# Patient Record
Sex: FEMALE | Race: WHITE | NOT HISPANIC OR LATINO | Employment: OTHER | URBAN - METROPOLITAN AREA
[De-identification: names, ages, dates, MRNs, and addresses within clinical notes are randomized per-mention and may not be internally consistent; named-entity substitution may affect disease eponyms.]

---

## 2023-07-23 ENCOUNTER — HOSPITAL ENCOUNTER (EMERGENCY)
Facility: HOSPITAL | Age: 88
Discharge: HOME | End: 2023-07-23
Attending: EMERGENCY MEDICINE
Payer: MEDICARE

## 2023-07-23 VITALS
RESPIRATION RATE: 18 BRPM | SYSTOLIC BLOOD PRESSURE: 107 MMHG | HEART RATE: 68 BPM | OXYGEN SATURATION: 93 % | DIASTOLIC BLOOD PRESSURE: 56 MMHG | TEMPERATURE: 96.4 F

## 2023-07-23 DIAGNOSIS — R04.0 EPISTAXIS: Primary | ICD-10-CM

## 2023-07-23 DIAGNOSIS — Z79.01 CHRONIC ANTICOAGULATION: ICD-10-CM

## 2023-07-23 LAB
BASOPHILS # BLD AUTO: 0.11 THOUSANDS/ÂΜL (ref 0–0.1)
BASOPHILS NFR BLD AUTO: 1 % (ref 0–1)
EOSINOPHIL # BLD AUTO: 0.24 THOUSAND/ÂΜL (ref 0–0.61)
EOSINOPHIL NFR BLD AUTO: 3 % (ref 0–6)
ERYTHROCYTE [DISTWIDTH] IN BLOOD BY AUTOMATED COUNT: 15.7 % (ref 11.6–15.1)
HCT VFR BLD AUTO: 50.6 % (ref 34.8–46.1)
HGB BLD-MCNC: 16.4 G/DL (ref 11.5–15.4)
IMM GRANULOCYTES # BLD AUTO: 0.09 THOUSAND/UL (ref 0–0.2)
IMM GRANULOCYTES NFR BLD AUTO: 1 % (ref 0–2)
INR PPP: 2.49 (ref 0.84–1.19)
LYMPHOCYTES # BLD AUTO: 3.68 THOUSANDS/ÂΜL (ref 0.6–4.47)
LYMPHOCYTES NFR BLD AUTO: 42 % (ref 14–44)
MCH RBC QN AUTO: 32.3 PG (ref 26.8–34.3)
MCHC RBC AUTO-ENTMCNC: 32.4 G/DL (ref 31.4–37.4)
MCV RBC AUTO: 100 FL (ref 82–98)
MONOCYTES # BLD AUTO: 1.05 THOUSAND/ÂΜL (ref 0.17–1.22)
MONOCYTES NFR BLD AUTO: 12 % (ref 4–12)
NEUTROPHILS # BLD AUTO: 3.69 THOUSANDS/ÂΜL (ref 1.85–7.62)
NEUTS SEG NFR BLD AUTO: 41 % (ref 43–75)
NRBC BLD AUTO-RTO: 0 /100 WBCS
PLATELET # BLD AUTO: 176 THOUSANDS/UL (ref 149–390)
PMV BLD AUTO: 9.6 FL (ref 8.9–12.7)
PROTHROMBIN TIME: 27.2 SECONDS (ref 11.6–14.5)
RBC # BLD AUTO: 5.08 MILLION/UL (ref 3.81–5.12)
WBC # BLD AUTO: 8.86 THOUSAND/UL (ref 4.31–10.16)

## 2023-07-23 PROCEDURE — 85610 PROTHROMBIN TIME: CPT

## 2023-07-23 PROCEDURE — 85025 COMPLETE CBC W/AUTO DIFF WBC: CPT

## 2023-07-23 PROCEDURE — 99283 EMERGENCY DEPT VISIT LOW MDM: CPT

## 2023-07-23 PROCEDURE — 36415 COLL VENOUS BLD VENIPUNCTURE: CPT

## 2023-07-23 PROCEDURE — 99284 EMERGENCY DEPT VISIT MOD MDM: CPT | Performed by: EMERGENCY MEDICINE

## 2023-07-23 RX ORDER — OXYMETAZOLINE HYDROCHLORIDE 0.05 G/100ML
2 SPRAY NASAL ONCE
Status: COMPLETED | OUTPATIENT
Start: 2023-07-23 | End: 2023-07-23

## 2023-07-23 RX ORDER — TRANEXAMIC ACID 100 MG/ML
500 INJECTION, SOLUTION INTRAVENOUS ONCE
Status: COMPLETED | OUTPATIENT
Start: 2023-07-23 | End: 2023-07-23

## 2023-07-23 RX ORDER — LIDOCAINE HYDROCHLORIDE AND EPINEPHRINE 10; 10 MG/ML; UG/ML
1 INJECTION, SOLUTION INFILTRATION; PERINEURAL ONCE
Status: COMPLETED | OUTPATIENT
Start: 2023-07-23 | End: 2023-07-23

## 2023-07-23 RX ADMIN — OXYMETAZOLINE HYDROCHLORIDE 2 SPRAY: 0.05 SPRAY NASAL at 04:50

## 2023-07-23 RX ADMIN — LIDOCAINE HYDROCHLORIDE,EPINEPHRINE BITARTRATE 1 ML: 10; .01 INJECTION, SOLUTION INFILTRATION; PERINEURAL at 05:53

## 2023-07-23 RX ADMIN — TRANEXAMIC ACID 500 MG: 100 INJECTION INTRAVENOUS at 04:50

## 2023-07-23 NOTE — ED PROVIDER NOTES
History  Chief Complaint   Patient presents with   • Nose Bleed Re-evaluation     Pt woke up and felt a gush from her nose. Eye also bleeding. On coumadin     80year-old female with a history of A-fib on warfarin who presents complaining of epistaxis. The patient states that she woke up from her sleep and her nose was bleeding. She states that she has a history of nosebleeds and has had to be packed in the past.  The patient's daughter states that she tried Afrin and held pressure for 20 minutes however the bleeding continued. The patient denies any falls or trauma. The patient's daughter states that the left side of the patient's face always appears mildly swollen due to a fall years ago. The patient denies headache, lightheadedness, chest pain, palpitations, shortness of breath, nausea, vomiting. None       No past medical history on file. No past surgical history on file. No family history on file. I have reviewed and agree with the history as documented. No existing history information found. No existing history information found. Review of Systems   Constitutional: Negative for chills, diaphoresis and fever. HENT: Positive for nosebleeds. Negative for congestion and sore throat. Eyes: Negative for pain and redness. Respiratory: Negative for cough and shortness of breath. Cardiovascular: Negative for chest pain, palpitations and leg swelling. Gastrointestinal: Negative for abdominal pain, diarrhea, nausea and vomiting. Genitourinary: Negative for dysuria, flank pain and hematuria. Musculoskeletal: Negative for arthralgias and myalgias. Skin: Negative for color change, pallor and rash. Neurological: Negative for dizziness, syncope, weakness, light-headedness, numbness and headaches. All other systems reviewed and are negative.       Physical Exam  ED Triage Vitals   Temperature Pulse Respirations Blood Pressure SpO2   07/23/23 0435 07/23/23 0435 07/23/23 0435 07/23/23 0435 07/23/23 0435   (!) 96.4 °F (35.8 °C) 71 18 139/65 95 %      Temp Source Heart Rate Source Patient Position - Orthostatic VS BP Location FiO2 (%)   07/23/23 0435 07/23/23 0435 07/23/23 0600 07/23/23 0435 --   Tympanic Monitor Lying Right arm       Pain Score       07/23/23 0435       No Pain             Orthostatic Vital Signs  Vitals:    07/23/23 0435 07/23/23 0600 07/23/23 0700   BP: 139/65 149/60 107/56   Pulse: 71 74 68   Patient Position - Orthostatic VS:  Lying Lying       Physical Exam  Vitals and nursing note reviewed. Constitutional:       General: She is not in acute distress. Appearance: Normal appearance. She is not ill-appearing, toxic-appearing or diaphoretic. HENT:      Head: Normocephalic and atraumatic. Nose: No nasal deformity, septal deviation or nasal tenderness. Right Nostril: Epistaxis present. No septal hematoma. Left Nostril: Epistaxis present. No septal hematoma. Right Sinus: No maxillary sinus tenderness or frontal sinus tenderness. Left Sinus: No maxillary sinus tenderness or frontal sinus tenderness. Mouth/Throat:      Mouth: Mucous membranes are moist.      Comments: Blood in the mouth. No intraoral bleeding. Eyes:      General: No scleral icterus. Extraocular Movements: Extraocular movements intact. Conjunctiva/sclera: Conjunctivae normal.      Pupils: Pupils are equal, round, and reactive to light. Cardiovascular:      Rate and Rhythm: Normal rate and regular rhythm. Pulses: Normal pulses. Heart sounds: Normal heart sounds. No murmur heard. No friction rub. No gallop. Pulmonary:      Effort: Pulmonary effort is normal.      Breath sounds: Normal breath sounds. No wheezing, rhonchi or rales. Abdominal:      General: Abdomen is flat. Palpations: Abdomen is soft. Tenderness: There is no abdominal tenderness. There is no right CVA tenderness, left CVA tenderness, guarding or rebound. Musculoskeletal:         General: No swelling, tenderness, deformity or signs of injury. Normal range of motion. Cervical back: Normal range of motion and neck supple. No rigidity or tenderness. Right lower leg: No edema. Left lower leg: No edema. Lymphadenopathy:      Cervical: No cervical adenopathy. Skin:     General: Skin is warm and dry. Capillary Refill: Capillary refill takes less than 2 seconds. Coloration: Skin is not jaundiced or pale. Findings: No bruising, erythema, lesion or rash. Neurological:      General: No focal deficit present. Mental Status: She is alert and oriented to person, place, and time.          ED Medications  Medications   oxymetazoline (AFRIN) 0.05 % nasal spray 2 spray (2 sprays Each Nare Given by Other 7/23/23 0450)   tranexamic acid 100mg/mL (for epistaxis) 500 mg (500 mg Nasal Given by Other 7/23/23 0450)   lidocaine-epinephrine (XYLOCAINE/EPINEPHRINE) 1 %-1:100,000 injection 1 mL (1 mL Infiltration Given by Other 7/23/23 0553)       Diagnostic Studies  Results Reviewed     Procedure Component Value Units Date/Time    Protime-INR [[id number]]  (Abnormal) Collected: 07/23/23 0624    Lab Status: Final result Specimen: Blood from Arm, Left Updated: 07/23/23 0654     Protime 27.2 seconds      INR 2.49    CBC and differential [[id number]]  (Abnormal) Collected: 07/23/23 0451    Lab Status: Final result Specimen: Blood from Arm, Left Updated: 07/23/23 0500     WBC 8.86 Thousand/uL      RBC 5.08 Million/uL      Hemoglobin 16.4 g/dL      Hematocrit 50.6 %       fL      MCH 32.3 pg      MCHC 32.4 g/dL      RDW 15.7 %      MPV 9.6 fL      Platelets 079 Thousands/uL      nRBC 0 /100 WBCs      Neutrophils Relative 41 %      Immat GRANS % 1 %      Lymphocytes Relative 42 %      Monocytes Relative 12 %      Eosinophils Relative 3 %      Basophils Relative 1 %      Neutrophils Absolute 3.69 Thousands/µL      Immature Grans Absolute 0.09 Thousand/uL Lymphocytes Absolute 3.68 Thousands/µL      Monocytes Absolute 1.05 Thousand/µL      Eosinophils Absolute 0.24 Thousand/µL      Basophils Absolute 0.11 Thousands/µL                  No orders to display         Procedures  Procedures      ED Course                                       Medical Decision Making  51-year-old female with a history of A-fib on warfarin who presents complaining of epistaxis that began during the night. Vitals are within the normal limits. Vitals are within the normal limits. On exam the patient is in no acute distress, epistaxis from the left nares, blood in the patient's mouth but no intraoral bleeding, no facial tenderness, heart is regular rate and rhythm, lungs are clear to auscultation bilaterally. Will order CBC and INR. Afrin is sprayed of both nares and pressure is applied. The patient continues to have slow bleeding in the left nare. TXA is advised and pressure is applied. The patient has very slow bleeding from the left nare. Lidocaine with epinephrine is atomized into the left nare. The patient has no subsequent epistaxis. INR is therapeutic at 2.49. The patient is given return precautions and discharged. Amount and/or Complexity of Data Reviewed  Labs: ordered. Risk  OTC drugs. Prescription drug management. Disposition  Final diagnoses:   Epistaxis   Chronic anticoagulation     Time reflects when diagnosis was documented in both MDM as applicable and the Disposition within this note     Time User Action Codes Description Comment    7/23/2023  7:09 AM Kari BARILLAS Add [R04.0] Epistaxis     7/23/2023  7:10 AM Bebeto Alves Add [Z79.01] Chronic anticoagulation       ED Disposition     ED Disposition   Discharge    Condition   Stable    Date/Time   Sun Jul 23, 2023  7:09 AM    Mary Beth Goncalves discharge to home/self care.                Follow-up Information     Follow up With Specialties Details Why Contact Info Additional [facility] Emergency Department Emergency Medicine Go to  If symptoms worsen [address] [address]  [facility], [address] [facility] [place], [place], [place], [address] [phone]          There are no discharge medications for this patient. No discharge procedures on file. PDMP Review     None           ED Provider  Attending physically available and evaluated Cely Goncalves. I managed the patient along with the ED Attending.     Electronically Signed by         Vanessa Moe DO  07/23/23 5588

## 2023-07-23 NOTE — ED ATTENDING ATTESTATION
Final Diagnoses:     1. Epistaxis           I, Trevor Valderrama MD, saw and evaluated the patient. All available labs and X-rays were ordered by me or the resident / non-physician and have been reviewed by myself. I discussed the patient with the resident / non-physician and agree with the resident's / non-physician practitioner's findings and plan as documented in the resident's / non-physician practicitioner's note, except where noted. At this point, I agree with the current assessment done in the ED. I was present during key portions of all procedures performed unless otherwise stated. Nursing Triage:     Chief Complaint   Patient presents with   • Nose Bleed Re-evaluation     Pt woke up and felt a gush from her nose. Eye also bleeding. On coumadin       HPI:   This is a 80 y.o. female presenting for evaluation of epistaxis. The patietn was perfectly fine today  She went to bed, then woke up, and she had a gush of fluid. There was blood everywhere. She is on warfarin. Denies any acute complaints other than the epistaxis. ASSESSMENT + PLAN:   Epistaxis. - Patient has epistaxis. It looks like this bleeding is       [ x ] Anterior bleed       [    ] Posterior bleed  - Patient is currently on anti-coagulants ? warfarin  - Will attempt control:   Vasoconstriction:        [ x ] Afrin        [    ] Cocaine        [    ] [place]        [    ] Lidocaine-epinephrine   Cautery:        [ x ] Chemical with silver-nitrate around the base of the bleeding area        [    ] Electrical   Physical / Packing        [ x ] Merocel packing ? potentially         [    ] Rhino-rocket        [    ] Epi-stat        [    ] Pediatric hallman         [    ] Balloon catheter   Clotting agents        [ x ] TXA        [    ] Topical thrombin        [    ] DDAVP if dialysis / PLT dysfunction  - Stressed f/u with PCP/ENT + re-evaluate as well as return to ER for worsening or resumption of bleeding.      Physical: Pertinent: marked epistaxis, both nares  There's a lot of blood around her face and on her LEFT eye. I irrigated the eye and removed all the blood clots there. LEFT nare looked more bloody  I held pressure for 5 minutes with my fingers. LEFT face appears more swollen than the RIGHT  No tenderness though. EOMI  No signs of enratpement. General: VS reviewed  Appears in NAD  awake, alert. Well-nourished, well-developed. Appears stated age. Speaking normally in full sentences. Head: Normocephalic, atraumatic  Eyes: EOM-I. No diplopia. No hyphema. No subconjunctival hemorrhages. Symmetrical lids. ENT: Atraumatic external nose and ears. MMM  No malocclusion. No stridor. Normal phonation. No drooling. Normal swallowing. Neck: No JVD. CV: No pallor noted  Lungs:   No tachypnea  No respiratory distress  Abd: soft nt nd no rebound/guarding  MSK:   FROM spontaneously  Skin: Dry, intact. Neuro: Awake, alert, GCS15, CN II-XII grossly intact. Motor grossly intact. Psychiatric/Behavioral: interacting normally; appropriate mood/affect.  Exam: deferred    Vitals:    07/23/23 0435 07/23/23 0600   BP: 139/65 149/60   BP Location: Right arm Right arm   Pulse: 71 74   Resp: 18 18   Temp: (!) 96.4 °F (35.8 °C)    TempSrc: Tympanic    SpO2: 95% 94%     - There are no obvious limitations to social determinants of care. - Nursing note reviewed. - Vitals reviewed. - Orders placed by myself and/or advanced practitioner / resident.    - Previous chart was reviewed  - No language barrier.   - History obtained from patient. - There are no limitations to the history obtained:     Past Medical:    has no past medical history on file. Past Surgical:    has no past surgical history on file.     Social:     Social History     Substance and Sexual Activity   Alcohol Use Not on file     Social History     Tobacco Use   Smoking Status Not on file   Smokeless Tobacco Not on file     Social History Substance and Sexual Activity   Drug Use Not on file       Echo:   No results found for this or any previous visit. No results found for this or any previous visit. Cath:    No results found for this or any previous visit.       Code Status: No Order  Advance Directive and Living Will:      Power of :    POLST:    Medications   oxymetazoline (AFRIN) 0.05 % nasal spray 2 spray (2 sprays Each Nare Given by Other 7/23/23 0450)   tranexamic acid 100mg/mL (for epistaxis) 500 mg (500 mg Nasal Given by Other 7/23/23 0450)   lidocaine-epinephrine (XYLOCAINE/EPINEPHRINE) 1 %-1:100,000 injection 1 mL (1 mL Infiltration Given by Other 7/23/23 0553)     No orders to display     Orders Placed This Encounter   Procedures   • CBC and differential   • Protime-INR     Labs Reviewed   CBC AND DIFFERENTIAL - Abnormal       Result Value Ref Range Status    WBC 8.86  4.31 - 10.16 Thousand/uL Final    RBC 5.08  3.81 - 5.12 Million/uL Final    Hemoglobin 16.4 (*) 11.5 - 15.4 g/dL Final    Hematocrit 50.6 (*) 34.8 - 46.1 % Final     (*) 82 - 98 fL Final    MCH 32.3  26.8 - 34.3 pg Final    MCHC 32.4  31.4 - 37.4 g/dL Final    RDW 15.7 (*) 11.6 - 15.1 % Final    MPV 9.6  8.9 - 12.7 fL Final    Platelets 533  492 - 390 Thousands/uL Final    nRBC 0  /100 WBCs Final    Neutrophils Relative 41 (*) 43 - 75 % Final    Immat GRANS % 1  0 - 2 % Final    Lymphocytes Relative 42  14 - 44 % Final    Monocytes Relative 12  4 - 12 % Final    Eosinophils Relative 3  0 - 6 % Final    Basophils Relative 1  0 - 1 % Final    Neutrophils Absolute 3.69  1.85 - 7.62 Thousands/µL Final    Immature Grans Absolute 0.09  0.00 - 0.20 Thousand/uL Final    Lymphocytes Absolute 3.68  0.60 - 4.47 Thousands/µL Final    Monocytes Absolute 1.05  0.17 - 1.22 Thousand/µL Final    Eosinophils Absolute 0.24  0.00 - 0.61 Thousand/µL Final    Basophils Absolute 0.11 (*) 0.00 - 0.10 Thousands/µL Final   PROTIME-INR - Abnormal    Protime 27.2 (*) 11.6 - 14.5 seconds Final    INR 2.49 (*) 0.84 - 1.19 Final     Time reflects when diagnosis was documented in both MDM as applicable and the Disposition within this note     Time User Action Codes Description Comment    7/23/2023  7:09 AM David Bolanos Add [R04.0] Epistaxis       ED Disposition     ED Disposition   Discharge    Condition   Stable    Date/Time   Sun Jul 23, 2023  7:09 AM    Comment   Sejal Goncalves discharge to home/self care. Follow-up Information    None       Patient's Medications    No medications on file     No discharge procedures on file. None                        Portions of the record may have been created with voice recognition software. Occasional wrong word or "sound a like" substitutions may have occurred due to the inherent limitations of voice recognition software. Read the chart carefully and recognize, using context, where substitutions have occurred.     Electronically signed by:  Ellie Driscoll

## 2023-07-23 NOTE — DISCHARGE INSTRUCTIONS
You were seen in the emergency department for a nosebleed. The bleeding was stopped with multiple medications. Your INR was at goal.  If you have any more bleeding please return to the emergency department.